# Patient Record
Sex: MALE | URBAN - METROPOLITAN AREA
[De-identification: names, ages, dates, MRNs, and addresses within clinical notes are randomized per-mention and may not be internally consistent; named-entity substitution may affect disease eponyms.]

---

## 2022-06-06 ENCOUNTER — OFFICE VISIT (OUTPATIENT)
Dept: URBAN - METROPOLITAN AREA SURGERY CENTER 8 | Facility: SURGERY CENTER | Age: 37
End: 2022-06-06
Payer: COMMERCIAL

## 2022-06-06 DIAGNOSIS — K22.89 DILATION OF ESOPHAGUS: ICD-10-CM

## 2022-06-06 DIAGNOSIS — K31.89 ACQUIRED DEFORMITY OF DUODENUM: ICD-10-CM

## 2022-06-06 DIAGNOSIS — R93.3 ABN FINDINGS-GI TRACT: ICD-10-CM

## 2022-06-06 PROCEDURE — G8907 PT DOC NO EVENTS ON DISCHARG: HCPCS | Performed by: INTERNAL MEDICINE

## 2022-06-06 PROCEDURE — 43239 EGD BIOPSY SINGLE/MULTIPLE: CPT | Performed by: INTERNAL MEDICINE

## 2025-02-04 ENCOUNTER — DASHBOARD ENCOUNTERS (OUTPATIENT)
Age: 40
End: 2025-02-04

## 2025-02-04 ENCOUNTER — LAB OUTSIDE AN ENCOUNTER (OUTPATIENT)
Dept: URBAN - METROPOLITAN AREA CLINIC 19 | Facility: CLINIC | Age: 40
End: 2025-02-04

## 2025-02-04 ENCOUNTER — OFFICE VISIT (OUTPATIENT)
Dept: URBAN - METROPOLITAN AREA CLINIC 19 | Facility: CLINIC | Age: 40
End: 2025-02-04
Payer: COMMERCIAL

## 2025-02-04 VITALS
OXYGEN SATURATION: 98 % | WEIGHT: 226.6 LBS | HEART RATE: 81 BPM | TEMPERATURE: 99 F | HEIGHT: 70 IN | BODY MASS INDEX: 32.44 KG/M2 | DIASTOLIC BLOOD PRESSURE: 80 MMHG | SYSTOLIC BLOOD PRESSURE: 120 MMHG

## 2025-02-04 DIAGNOSIS — K57.92 ACUTE DIVERTICULITIS: ICD-10-CM

## 2025-02-04 DIAGNOSIS — R10.13 DYSPEPSIA: ICD-10-CM

## 2025-02-04 DIAGNOSIS — R19.8 TENESMUS: ICD-10-CM

## 2025-02-04 DIAGNOSIS — R15.1 FECAL SMEARING: ICD-10-CM

## 2025-02-04 PROCEDURE — 99204 OFFICE O/P NEW MOD 45 MIN: CPT | Performed by: INTERNAL MEDICINE

## 2025-02-04 RX ORDER — OMEPRAZOLE 40 MG/1
1 CAPSULE 1/2 TO 1 HOUR BEFORE MORNING MEAL CAPSULE, DELAYED RELEASE ORAL ONCE A DAY
Qty: 30 | OUTPATIENT
Start: 2025-02-04

## 2025-02-04 NOTE — HPI-TODAY'S VISIT:
2/4/25 (Dr. Eliel López):  Patient, who is a , presents as a referral from the [facility] ED (with approval from the [facility]) for evaluation of lower abdominal pain. Records reviewed. Patient presented to the [facility] ED on 10/13/24 for severe lower abdominal pain for one day. CT abdomen/pelvis showed acute sigmoid diverticulitis without complication. He was given a prescription for Augmentin and Percocet and told to follow up with gastroenterology. He feels better overall since then, but he has a sensation of rectal/lower abdominal discomfort related to bowel movements that is relieved when he actually defecates. However, he can spend up to 2 hours in the bathroom daily with severe tenesmus. He also complains that he has "leakage" of liquid around the anal canal. He states that by using baby wipes often, he does not stain his underwear. He does not have maria l fecal incontinence. His stools are soft. No blood or mucus in stool.  Patient also complains of an intermittent "upset stomach", which occurs 2-3 times a week. Tums sometimes helps but not always. In the past, he saw Dr. Kevin Parmar for his gastrointestinal issues. On 6/6/22, Dr. Parmar performed an EGD for "abnormal CT scan of the GI tract". I have no other records. It showed possible rings in the esophagus and erythema in the stomach. Biopsies from the stomach and duodenum were normal.

## 2025-02-05 ENCOUNTER — TELEPHONE ENCOUNTER (OUTPATIENT)
Dept: URBAN - METROPOLITAN AREA CLINIC 19 | Facility: CLINIC | Age: 40
End: 2025-02-05

## 2025-02-05 LAB
ALBUMIN/GLOBULIN RATIO: 1.8
ALBUMIN: 4.6
ALKALINE PHOSPHATASE: 67
ALT: 27
AST: 25
BILIRUBIN, TOTAL: 0.5
BUN/CREATININE RATIO: (no result)
C-REACTIVE PROTEIN, QUANT: <3
CALCIUM: 9.2
CARBON DIOXIDE: 26
CHLORIDE: 102
CREATININE: 0.93
GLOBULIN: 2.5
GLUCOSE: 73
HEMATOCRIT: 48.8
HEMOGLOBIN: 16.6
MCH: 30.8
MCHC: 34
MCV: 90.5
MPV: 9.9
PLATELET COUNT: 337
POTASSIUM: 4.2
PROTEIN, TOTAL: 7.1
RDW: 13.8
RED BLOOD CELL COUNT: 5.39
SED RATE BY MODIFIED: 2
SODIUM: 138
UREA NITROGEN (BUN): 13
WHITE BLOOD CELL COUNT: 5.5

## 2025-02-27 ENCOUNTER — TELEPHONE ENCOUNTER (OUTPATIENT)
Dept: URBAN - METROPOLITAN AREA CLINIC 54 | Facility: CLINIC | Age: 40
End: 2025-02-27

## 2025-03-10 ENCOUNTER — OFFICE VISIT (OUTPATIENT)
Dept: URBAN - METROPOLITAN AREA SURGERY CENTER 31 | Facility: SURGERY CENTER | Age: 40
End: 2025-03-10
Payer: OTHER GOVERNMENT

## 2025-03-10 ENCOUNTER — CLAIMS CREATED FROM THE CLAIM WINDOW (OUTPATIENT)
Dept: URBAN - METROPOLITAN AREA CLINIC 4 | Facility: CLINIC | Age: 40
End: 2025-03-10
Payer: OTHER GOVERNMENT

## 2025-03-10 DIAGNOSIS — K31.89 OTHER DISEASES OF STOMACH AND DUODENUM: ICD-10-CM

## 2025-03-10 DIAGNOSIS — K57.30 DIVERTICULOSIS OF COLON: ICD-10-CM

## 2025-03-10 DIAGNOSIS — K31.89 ACHYLIA: ICD-10-CM

## 2025-03-10 DIAGNOSIS — R10.13 ABDOMINAL DISCOMFORT, EPIGASTRIC: ICD-10-CM

## 2025-03-10 DIAGNOSIS — K57.30 ACQUIRED DIVERTICULOSIS OF COLON: ICD-10-CM

## 2025-03-10 DIAGNOSIS — Z87.19 ESOPHAGEAL FOOD BOLUS: ICD-10-CM

## 2025-03-10 PROCEDURE — 88312 SPECIAL STAINS GROUP 1: CPT | Performed by: PATHOLOGY

## 2025-03-10 PROCEDURE — 88342 IMHCHEM/IMCYTCHM 1ST ANTB: CPT | Performed by: PATHOLOGY

## 2025-03-10 PROCEDURE — 88305 TISSUE EXAM BY PATHOLOGIST: CPT | Performed by: PATHOLOGY

## 2025-03-10 PROCEDURE — 00813 ANES UPR LWR GI NDSC PX: CPT | Performed by: NURSE ANESTHETIST, CERTIFIED REGISTERED

## 2025-03-10 PROCEDURE — 43239 EGD BIOPSY SINGLE/MULTIPLE: CPT | Performed by: INTERNAL MEDICINE

## 2025-03-10 PROCEDURE — 45378 DIAGNOSTIC COLONOSCOPY: CPT | Performed by: INTERNAL MEDICINE

## 2025-03-10 RX ORDER — OMEPRAZOLE 40 MG/1
1 CAPSULE 1/2 TO 1 HOUR BEFORE MORNING MEAL CAPSULE, DELAYED RELEASE ORAL ONCE A DAY
Qty: 30 | Status: ACTIVE | COMMUNITY
Start: 2025-02-04

## 2025-03-10 RX ORDER — ONDANSETRON HYDROCHLORIDE 4 MG/1
1 TABLET AS NEEDED TABLET, FILM COATED ORAL EVERY 4 HOURS
Qty: 12 TABLET | Refills: 0 | Status: ACTIVE | COMMUNITY
Start: 2025-03-03

## 2025-05-13 ENCOUNTER — OFFICE VISIT (OUTPATIENT)
Dept: URBAN - METROPOLITAN AREA CLINIC 19 | Facility: CLINIC | Age: 40
End: 2025-05-13
Payer: OTHER GOVERNMENT

## 2025-05-13 ENCOUNTER — LAB OUTSIDE AN ENCOUNTER (OUTPATIENT)
Dept: URBAN - METROPOLITAN AREA CLINIC 19 | Facility: CLINIC | Age: 40
End: 2025-05-13

## 2025-05-13 DIAGNOSIS — R19.8 TENESMUS: ICD-10-CM

## 2025-05-13 DIAGNOSIS — R15.1 FECAL SMEARING: ICD-10-CM

## 2025-05-13 DIAGNOSIS — R10.13 DYSPEPSIA: ICD-10-CM

## 2025-05-13 PROCEDURE — 99213 OFFICE O/P EST LOW 20 MIN: CPT | Performed by: INTERNAL MEDICINE

## 2025-05-13 RX ORDER — OMEPRAZOLE 20 MG/1
1 CAPSULE 1/2 TO 1 HOUR BEFORE MORNING MEAL CAPSULE, DELAYED RELEASE ORAL ONCE A DAY
Qty: 90 | Refills: 1 | OUTPATIENT
Start: 2025-05-13

## 2025-05-13 RX ORDER — OMEPRAZOLE 40 MG/1
1 CAPSULE 1/2 TO 1 HOUR BEFORE MORNING MEAL CAPSULE, DELAYED RELEASE ORAL ONCE A DAY
Qty: 30 | Status: ACTIVE | COMMUNITY
Start: 2025-02-04

## 2025-05-13 RX ORDER — ONDANSETRON HYDROCHLORIDE 4 MG/1
1 TABLET AS NEEDED TABLET, FILM COATED ORAL EVERY 4 HOURS
Qty: 12 TABLET | Refills: 0 | Status: ACTIVE | COMMUNITY
Start: 2025-03-03

## 2025-05-13 NOTE — HPI-TODAY'S VISIT:
2/4/25 (Dr. Eliel López):  Patient, who is a , presents as a referral from the [facility] ED (with approval from the [facility]) for evaluation of lower abdominal pain. Records reviewed. [facility] for severe lower abdominal pain for one day. CT abdomen/pelvis showed acute sigmoid diverticulitis without complication. He was given a prescription for Augmentin and Percocet and told to follow up with gastroenterology. He feels better overall since then, but he has a sensation of rectal/lower abdominal discomfort related to bowel movements that is relieved when he actually defecates. However, he can spend up to 2 hours in the bathroom daily with severe tenesmus. He also complains that he has "leakage" of liquid around the anal canal. He states that by using baby wipes often, he does not stain his underwear. He does not have maria l fecal incontinence. His stools are soft. No blood or mucus in stool.  Patient also complains of an intermittent "upset stomach", which occurs 2-3 times a week. Tums sometimes helps but not always. In the past, he saw Dr. Kevin Parmar for his gastrointestinal issues. On 6/6/22, Dr. Parmar performed an EGD for "abnormal CT scan of the GI tract". I have no other records. It showed possible rings in the esophagus and erythema in the stomach. Biopsies from the stomach and duodenum were normal.  5/13/25 (Dr. Eliel López):  Patient presents for reevaluation of his various gastrointestinal complaints. I performed an EGD/colonoscopy on 3/10/25 - there were no endoscopic abnormalities or pathologic abnormalities (negative gastric/duodenal biopsies) except for mild diverticular disease. He will need a screening colonoscopy in 10 years. However, the patient is having dyspepsia 2-3 times a week and is mainly concerned about some rectal discomfort and having to clean his anal area aggressively to remove stool, although he does not have maria l incontinence or actual stool in his undergarments.

## 2025-08-12 ENCOUNTER — OFFICE VISIT (OUTPATIENT)
Dept: URBAN - METROPOLITAN AREA CLINIC 19 | Facility: CLINIC | Age: 40
End: 2025-08-12